# Patient Record
Sex: MALE | Race: WHITE
[De-identification: names, ages, dates, MRNs, and addresses within clinical notes are randomized per-mention and may not be internally consistent; named-entity substitution may affect disease eponyms.]

---

## 2017-04-02 ENCOUNTER — HOSPITAL ENCOUNTER (EMERGENCY)
Dept: HOSPITAL 4 - SED | Age: 72
Discharge: HOME | End: 2017-04-02
Payer: COMMERCIAL

## 2017-04-02 VITALS
DIASTOLIC BLOOD PRESSURE: 59 MMHG | HEART RATE: 62 BPM | TEMPERATURE: 98.2 F | OXYGEN SATURATION: 99 % | RESPIRATION RATE: 16 BRPM | SYSTOLIC BLOOD PRESSURE: 118 MMHG

## 2017-04-02 VITALS
SYSTOLIC BLOOD PRESSURE: 115 MMHG | TEMPERATURE: 98.2 F | RESPIRATION RATE: 16 BRPM | OXYGEN SATURATION: 99 % | DIASTOLIC BLOOD PRESSURE: 56 MMHG | HEART RATE: 51 BPM

## 2017-04-02 VITALS — WEIGHT: 240 LBS | BODY MASS INDEX: 35.55 KG/M2 | HEIGHT: 69 IN

## 2017-04-02 DIAGNOSIS — I10: ICD-10-CM
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
## 2017-04-02 DIAGNOSIS — S82.52XA: Primary | ICD-10-CM
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
## 2017-04-02 DIAGNOSIS — I48.91: ICD-10-CM
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
## 2017-04-02 DIAGNOSIS — J44.9: ICD-10-CM

## 2017-04-02 DIAGNOSIS — Z88.8: ICD-10-CM

## 2017-04-02 NOTE — NUR
Pt presents to ED c/o LLE pain and brusing to foot .Pt currently taking 
coumadin. Pt denies syncope , head or neck trauma.Pt declined medication for 
pain upon assessment. Pt reports taking Norco prior to ED visit.

## 2017-04-02 NOTE — NUR
Patient given written and verbal discharge instructions and verbalizes 
understanding.  ER MD discussed with patient the results and treatment 
provided. Given copies of tests performed in ER. Patient in stable condition. 
ID arm band removed. 

 Patient educated on pain management and to follow up with PMD. Pain Scale 2.

Opportunity for questions provided and answered.

## 2020-12-09 ENCOUNTER — HOSPITAL ENCOUNTER (INPATIENT)
Dept: HOSPITAL 4 - SED | Age: 75
LOS: 4 days | DRG: 871 | End: 2020-12-13
Attending: INTERNAL MEDICINE | Admitting: INTERNAL MEDICINE
Payer: COMMERCIAL

## 2020-12-09 VITALS — SYSTOLIC BLOOD PRESSURE: 142 MMHG

## 2020-12-09 VITALS — BODY MASS INDEX: 25.48 KG/M2 | HEIGHT: 69 IN | WEIGHT: 172 LBS

## 2020-12-09 VITALS — SYSTOLIC BLOOD PRESSURE: 120 MMHG

## 2020-12-09 VITALS — SYSTOLIC BLOOD PRESSURE: 134 MMHG

## 2020-12-09 DIAGNOSIS — Z66: ICD-10-CM

## 2020-12-09 DIAGNOSIS — Z88.8: ICD-10-CM

## 2020-12-09 DIAGNOSIS — J44.1: ICD-10-CM

## 2020-12-09 DIAGNOSIS — Z79.891: ICD-10-CM

## 2020-12-09 DIAGNOSIS — J96.00: ICD-10-CM

## 2020-12-09 DIAGNOSIS — Z20.828: ICD-10-CM

## 2020-12-09 DIAGNOSIS — N17.9: ICD-10-CM

## 2020-12-09 DIAGNOSIS — A41.9: Primary | ICD-10-CM

## 2020-12-09 DIAGNOSIS — I25.5: ICD-10-CM

## 2020-12-09 DIAGNOSIS — I46.9: ICD-10-CM

## 2020-12-09 DIAGNOSIS — Z79.01: ICD-10-CM

## 2020-12-09 DIAGNOSIS — Z79.899: ICD-10-CM

## 2020-12-09 DIAGNOSIS — I25.10: ICD-10-CM

## 2020-12-09 DIAGNOSIS — F17.210: ICD-10-CM

## 2020-12-09 DIAGNOSIS — Z51.5: ICD-10-CM

## 2020-12-09 DIAGNOSIS — Z79.82: ICD-10-CM

## 2020-12-09 DIAGNOSIS — J18.9: ICD-10-CM

## 2020-12-09 DIAGNOSIS — I48.20: ICD-10-CM

## 2020-12-09 DIAGNOSIS — E11.51: ICD-10-CM

## 2020-12-09 DIAGNOSIS — I50.43: ICD-10-CM

## 2020-12-09 DIAGNOSIS — I11.0: ICD-10-CM

## 2020-12-09 LAB
ALBUMIN SERPL BCP-MCNC: 2.7 G/DL (ref 3.4–4.8)
ALT SERPL W P-5'-P-CCNC: 17 U/L (ref 12–78)
ANION GAP SERPL CALCULATED.3IONS-SCNC: 8 MMOL/L (ref 5–15)
AST SERPL W P-5'-P-CCNC: 41 U/L (ref 10–37)
BASOPHILS # BLD AUTO: 0.1 K/UL (ref 0–0.2)
BASOPHILS NFR BLD AUTO: 1.2 % (ref 0–2)
BILIRUB SERPL-MCNC: 0.9 MG/DL (ref 0–1)
BUN SERPL-MCNC: 23 MG/DL (ref 8–21)
CALCIUM SERPL-MCNC: 9.2 MG/DL (ref 8.4–11)
CHLORIDE SERPL-SCNC: 104 MMOL/L (ref 98–107)
CREAT SERPL-MCNC: 1.38 MG/DL (ref 0.55–1.3)
EOSINOPHIL # BLD AUTO: 0.1 K/UL (ref 0–0.4)
EOSINOPHIL NFR BLD AUTO: 1 % (ref 0–4)
ERYTHROCYTE [DISTWIDTH] IN BLOOD BY AUTOMATED COUNT: 18.8 % (ref 9–15)
GFR SERPL CREATININE-BSD FRML MDRD: (no result) ML/MIN (ref 90–?)
GLUCOSE SERPL-MCNC: 207 MG/DL (ref 70–99)
HCT VFR BLD AUTO: 40.6 % (ref 36–54)
HGB BLD-MCNC: 13.2 G/DL (ref 14–18)
INR PPP: 1.2 (ref 0.8–1.2)
LYMPHOCYTES # BLD AUTO: 1.3 K/UL (ref 1–5.5)
LYMPHOCYTES NFR BLD AUTO: 15.3 % (ref 20.5–51.5)
MCH RBC QN AUTO: 28 PG (ref 27–31)
MCHC RBC AUTO-ENTMCNC: 33 % (ref 32–36)
MCV RBC AUTO: 85 FL (ref 79–98)
MONOCYTES # BLD MANUAL: 1 K/UL (ref 0–1)
MONOCYTES # BLD MANUAL: 11.5 % (ref 1.7–9.3)
NEUTROPHILS # BLD AUTO: 6.2 K/UL (ref 1.8–7.7)
NEUTROPHILS NFR BLD AUTO: 71 % (ref 40–70)
PLATELET # BLD AUTO: 279 K/UL (ref 130–430)
POTASSIUM SERPL-SCNC: 4.4 MMOL/L (ref 3.5–5.1)
PROTHROMBIN TIME: 12.6 SECS (ref 9.5–12.5)
RBC # BLD AUTO: 4.81 MIL/UL (ref 4.2–6.2)
SODIUM SERPLBLD-SCNC: 137 MMOL/L (ref 136–145)
WBC # BLD AUTO: 8.7 K/UL (ref 4.8–10.8)

## 2020-12-09 PROCEDURE — G0378 HOSPITAL OBSERVATION PER HR: HCPCS

## 2020-12-09 PROCEDURE — P9041 ALBUMIN (HUMAN),5%, 50ML: HCPCS

## 2020-12-09 PROCEDURE — U0003 INFECTIOUS AGENT DETECTION BY NUCLEIC ACID (DNA OR RNA); SEVERE ACUTE RESPIRATORY SYNDROME CORONAVIRUS 2 (SARS-COV-2) (CORONAVIRUS DISEASE [COVID-19]), AMPLIFIED PROBE TECHNIQUE, MAKING USE OF HIGH THROUGHPUT TECHNOLOGIES AS DESCRIBED BY CMS-2020-01-R: HCPCS

## 2020-12-09 RX ADMIN — APIXABAN SCH MG: 2.5 TABLET, FILM COATED ORAL at 21:21

## 2020-12-09 RX ADMIN — SACUBITRIL AND VALSARTAN SCH TABLET: 24; 26 TABLET, FILM COATED ORAL at 21:21

## 2020-12-09 RX ADMIN — NATEGLINIDE SCH MG: 120 TABLET ORAL at 21:18

## 2020-12-09 RX ADMIN — CEFTRIAXONE SODIUM SCH MLS/HR: 1 INJECTION, SOLUTION INTRAVENOUS at 21:14

## 2020-12-09 RX ADMIN — FUROSEMIDE SCH MG: 10 INJECTION, SOLUTION INTRAMUSCULAR; INTRAVENOUS at 18:29

## 2020-12-09 RX ADMIN — TEMAZEPAM SCH MG: 15 CAPSULE ORAL at 21:16

## 2020-12-09 RX ADMIN — Medication SCH MG: at 21:19

## 2020-12-10 VITALS — SYSTOLIC BLOOD PRESSURE: 96 MMHG

## 2020-12-10 VITALS — SYSTOLIC BLOOD PRESSURE: 124 MMHG

## 2020-12-10 VITALS — SYSTOLIC BLOOD PRESSURE: 104 MMHG

## 2020-12-10 VITALS — SYSTOLIC BLOOD PRESSURE: 131 MMHG

## 2020-12-10 VITALS — SYSTOLIC BLOOD PRESSURE: 112 MMHG

## 2020-12-10 VITALS — SYSTOLIC BLOOD PRESSURE: 94 MMHG

## 2020-12-10 VITALS — SYSTOLIC BLOOD PRESSURE: 110 MMHG

## 2020-12-10 VITALS — SYSTOLIC BLOOD PRESSURE: 106 MMHG

## 2020-12-10 VITALS — SYSTOLIC BLOOD PRESSURE: 95 MMHG

## 2020-12-10 VITALS — SYSTOLIC BLOOD PRESSURE: 120 MMHG

## 2020-12-10 LAB
ALBUMIN SERPL BCP-MCNC: 2.6 G/DL (ref 3.4–4.8)
ALT SERPL W P-5'-P-CCNC: 80 U/L (ref 12–78)
ANION GAP SERPL CALCULATED.3IONS-SCNC: 10 MMOL/L (ref 5–15)
AST SERPL W P-5'-P-CCNC: 170 U/L (ref 10–37)
BASOPHILS # BLD AUTO: 0.1 K/UL (ref 0–0.2)
BASOPHILS NFR BLD AUTO: 0.9 % (ref 0–2)
BILIRUB SERPL-MCNC: 1.3 MG/DL (ref 0–1)
BUN SERPL-MCNC: 25 MG/DL (ref 8–21)
CALCIUM SERPL-MCNC: 8.9 MG/DL (ref 8.4–11)
CHLORIDE SERPL-SCNC: 104 MMOL/L (ref 98–107)
CREAT SERPL-MCNC: 1.48 MG/DL (ref 0.55–1.3)
EOSINOPHIL # BLD AUTO: 0.1 K/UL (ref 0–0.4)
EOSINOPHIL NFR BLD AUTO: 1 % (ref 0–4)
ERYTHROCYTE [DISTWIDTH] IN BLOOD BY AUTOMATED COUNT: 18.8 % (ref 9–15)
GFR SERPL CREATININE-BSD FRML MDRD: (no result) ML/MIN (ref 90–?)
GLUCOSE SERPL-MCNC: 147 MG/DL (ref 70–99)
HCT VFR BLD AUTO: 41.4 % (ref 36–54)
HGB BLD-MCNC: 13.4 G/DL (ref 14–18)
LYMPHOCYTES # BLD AUTO: 1.4 K/UL (ref 1–5.5)
LYMPHOCYTES NFR BLD AUTO: 16 % (ref 20.5–51.5)
MCH RBC QN AUTO: 28 PG (ref 27–31)
MCHC RBC AUTO-ENTMCNC: 32 % (ref 32–36)
MCV RBC AUTO: 85 FL (ref 79–98)
MONOCYTES # BLD MANUAL: 0.9 K/UL (ref 0–1)
MONOCYTES # BLD MANUAL: 10.5 % (ref 1.7–9.3)
NEUTROPHILS # BLD AUTO: 6.2 K/UL (ref 1.8–7.7)
NEUTROPHILS NFR BLD AUTO: 71.6 % (ref 40–70)
PLATELET # BLD AUTO: 253 K/UL (ref 130–430)
POTASSIUM SERPL-SCNC: 4.2 MMOL/L (ref 3.5–5.1)
RBC # BLD AUTO: 4.87 MIL/UL (ref 4.2–6.2)
SODIUM SERPLBLD-SCNC: 137 MMOL/L (ref 136–145)
TSH SERPL DL<=0.05 MIU/L-ACNC: 2.11 UIU/ML (ref 0.36–3.74)
WBC # BLD AUTO: 8.7 K/UL (ref 4.8–10.8)

## 2020-12-10 PROCEDURE — B54MZZA ULTRASONOGRAPHY OF RIGHT UPPER EXTREMITY VEINS, GUIDANCE: ICD-10-PCS

## 2020-12-10 PROCEDURE — 0BH17EZ INSERTION OF ENDOTRACHEAL AIRWAY INTO TRACHEA, VIA NATURAL OR ARTIFICIAL OPENING: ICD-10-PCS

## 2020-12-10 PROCEDURE — 05HY33Z INSERTION OF INFUSION DEVICE INTO UPPER VEIN, PERCUTANEOUS APPROACH: ICD-10-PCS

## 2020-12-10 PROCEDURE — 5A1945Z RESPIRATORY VENTILATION, 24-96 CONSECUTIVE HOURS: ICD-10-PCS

## 2020-12-10 RX ADMIN — Medication SCH MG: at 21:00

## 2020-12-10 RX ADMIN — Medication SCH MG: at 09:40

## 2020-12-10 RX ADMIN — SODIUM CHLORIDE SCH MLS/HR: 0.9 INJECTION, SOLUTION INTRAVENOUS at 12:10

## 2020-12-10 RX ADMIN — FUROSEMIDE SCH MG: 10 INJECTION, SOLUTION INTRAMUSCULAR; INTRAVENOUS at 06:23

## 2020-12-10 RX ADMIN — SACUBITRIL AND VALSARTAN SCH TABLET: 24; 26 TABLET, FILM COATED ORAL at 09:41

## 2020-12-10 RX ADMIN — FUROSEMIDE SCH MG: 10 INJECTION, SOLUTION INTRAMUSCULAR; INTRAVENOUS at 17:11

## 2020-12-10 RX ADMIN — NATEGLINIDE SCH MG: 120 TABLET ORAL at 15:00

## 2020-12-10 RX ADMIN — APIXABAN SCH MG: 2.5 TABLET, FILM COATED ORAL at 21:00

## 2020-12-10 RX ADMIN — SERTRALINE HYDROCHLORIDE SCH MG: 50 TABLET, FILM COATED ORAL at 09:39

## 2020-12-10 RX ADMIN — APIXABAN SCH MG: 2.5 TABLET, FILM COATED ORAL at 09:42

## 2020-12-10 RX ADMIN — CEFTRIAXONE SODIUM SCH MLS/HR: 1 INJECTION, SOLUTION INTRAVENOUS at 20:00

## 2020-12-10 RX ADMIN — NATEGLINIDE SCH MG: 120 TABLET ORAL at 09:49

## 2020-12-10 RX ADMIN — TEMAZEPAM SCH MG: 15 CAPSULE ORAL at 21:00

## 2020-12-10 RX ADMIN — SACUBITRIL AND VALSARTAN SCH TABLET: 24; 26 TABLET, FILM COATED ORAL at 21:00

## 2020-12-10 RX ADMIN — Medication SCH MG: at 09:50

## 2020-12-10 RX ADMIN — ATORVASTATIN CALCIUM SCH MG: 20 TABLET, FILM COATED ORAL at 09:39

## 2020-12-10 RX ADMIN — NATEGLINIDE SCH MG: 120 TABLET ORAL at 21:00

## 2020-12-11 VITALS — SYSTOLIC BLOOD PRESSURE: 106 MMHG

## 2020-12-11 VITALS — SYSTOLIC BLOOD PRESSURE: 104 MMHG

## 2020-12-11 VITALS — SYSTOLIC BLOOD PRESSURE: 115 MMHG

## 2020-12-11 VITALS — SYSTOLIC BLOOD PRESSURE: 101 MMHG

## 2020-12-11 VITALS — SYSTOLIC BLOOD PRESSURE: 80 MMHG

## 2020-12-11 VITALS — SYSTOLIC BLOOD PRESSURE: 122 MMHG

## 2020-12-11 VITALS — SYSTOLIC BLOOD PRESSURE: 109 MMHG

## 2020-12-11 VITALS — SYSTOLIC BLOOD PRESSURE: 105 MMHG

## 2020-12-11 VITALS — SYSTOLIC BLOOD PRESSURE: 135 MMHG

## 2020-12-11 VITALS — SYSTOLIC BLOOD PRESSURE: 125 MMHG

## 2020-12-11 VITALS — SYSTOLIC BLOOD PRESSURE: 92 MMHG

## 2020-12-11 VITALS — SYSTOLIC BLOOD PRESSURE: 108 MMHG

## 2020-12-11 VITALS — SYSTOLIC BLOOD PRESSURE: 119 MMHG

## 2020-12-11 VITALS — SYSTOLIC BLOOD PRESSURE: 123 MMHG

## 2020-12-11 VITALS — SYSTOLIC BLOOD PRESSURE: 128 MMHG

## 2020-12-11 VITALS — SYSTOLIC BLOOD PRESSURE: 97 MMHG

## 2020-12-11 VITALS — SYSTOLIC BLOOD PRESSURE: 62 MMHG

## 2020-12-11 VITALS — SYSTOLIC BLOOD PRESSURE: 141 MMHG

## 2020-12-11 VITALS — SYSTOLIC BLOOD PRESSURE: 134 MMHG

## 2020-12-11 VITALS — SYSTOLIC BLOOD PRESSURE: 155 MMHG

## 2020-12-11 VITALS — SYSTOLIC BLOOD PRESSURE: 130 MMHG

## 2020-12-11 VITALS — SYSTOLIC BLOOD PRESSURE: 64 MMHG

## 2020-12-11 VITALS — SYSTOLIC BLOOD PRESSURE: 110 MMHG

## 2020-12-11 VITALS — SYSTOLIC BLOOD PRESSURE: 129 MMHG

## 2020-12-11 VITALS — SYSTOLIC BLOOD PRESSURE: 102 MMHG

## 2020-12-11 LAB
ALBUMIN SERPL BCP-MCNC: 2 G/DL (ref 3.4–4.8)
ALT SERPL W P-5'-P-CCNC: 248 U/L (ref 12–78)
ANION GAP SERPL CALCULATED.3IONS-SCNC: 15 MMOL/L (ref 5–15)
AST SERPL W P-5'-P-CCNC: 732 U/L (ref 10–37)
BASOPHILS # BLD AUTO: 0.1 K/UL (ref 0–0.2)
BASOPHILS NFR BLD AUTO: 0.4 % (ref 0–2)
BILIRUB SERPL-MCNC: 0.9 MG/DL (ref 0–1)
BUN SERPL-MCNC: 43 MG/DL (ref 8–21)
CALCIUM SERPL-MCNC: 8.5 MG/DL (ref 8.4–11)
CHLORIDE SERPL-SCNC: 103 MMOL/L (ref 98–107)
CREAT SERPL-MCNC: 2.44 MG/DL (ref 0.55–1.3)
EOSINOPHIL # BLD AUTO: 0 K/UL (ref 0–0.4)
EOSINOPHIL NFR BLD AUTO: 0 % (ref 0–4)
ERYTHROCYTE [DISTWIDTH] IN BLOOD BY AUTOMATED COUNT: 18.5 % (ref 9–15)
GFR SERPL CREATININE-BSD FRML MDRD: (no result) ML/MIN (ref 90–?)
GLUCOSE SERPL-MCNC: 244 MG/DL (ref 70–99)
HCT VFR BLD AUTO: 39.1 % (ref 36–54)
HGB BLD-MCNC: 12.5 G/DL (ref 14–18)
LYMPHOCYTES # BLD AUTO: 0.6 K/UL (ref 1–5.5)
LYMPHOCYTES NFR BLD AUTO: 4.5 % (ref 20.5–51.5)
MCH RBC QN AUTO: 27 PG (ref 27–31)
MCHC RBC AUTO-ENTMCNC: 32 % (ref 32–36)
MCV RBC AUTO: 85 FL (ref 79–98)
MONOCYTES # BLD MANUAL: 0.8 K/UL (ref 0–1)
MONOCYTES # BLD MANUAL: 5.4 % (ref 1.7–9.3)
NEUTROPHILS # BLD AUTO: 12.8 K/UL (ref 1.8–7.7)
NEUTROPHILS NFR BLD AUTO: 89.7 % (ref 40–70)
PLATELET # BLD AUTO: 228 K/UL (ref 130–430)
POTASSIUM SERPL-SCNC: 4.4 MMOL/L (ref 3.5–5.1)
RBC # BLD AUTO: 4.59 MIL/UL (ref 4.2–6.2)
SODIUM SERPLBLD-SCNC: 136 MMOL/L (ref 136–145)
WBC # BLD AUTO: 14.2 K/UL (ref 4.8–10.8)

## 2020-12-11 RX ADMIN — SODIUM CHLORIDE SCH MLS/HR: 0.9 INJECTION, SOLUTION INTRAVENOUS at 11:51

## 2020-12-11 RX ADMIN — CEFTRIAXONE SODIUM SCH MLS/HR: 1 INJECTION, SOLUTION INTRAVENOUS at 20:40

## 2020-12-11 RX ADMIN — NATEGLINIDE SCH MG: 120 TABLET ORAL at 08:58

## 2020-12-11 RX ADMIN — ATORVASTATIN CALCIUM SCH MG: 20 TABLET, FILM COATED ORAL at 08:54

## 2020-12-11 RX ADMIN — SERTRALINE HYDROCHLORIDE SCH MG: 50 TABLET, FILM COATED ORAL at 08:58

## 2020-12-11 RX ADMIN — Medication SCH MG: at 08:57

## 2020-12-11 RX ADMIN — PROPOFOL PRN MLS/HR: 10 INJECTION, EMULSION INTRAVENOUS at 14:26

## 2020-12-11 RX ADMIN — APIXABAN SCH MG: 2.5 TABLET, FILM COATED ORAL at 08:54

## 2020-12-11 RX ADMIN — SACUBITRIL AND VALSARTAN SCH TABLET: 24; 26 TABLET, FILM COATED ORAL at 20:41

## 2020-12-11 RX ADMIN — TEMAZEPAM SCH MG: 15 CAPSULE ORAL at 20:40

## 2020-12-11 RX ADMIN — SACUBITRIL AND VALSARTAN SCH TABLET: 24; 26 TABLET, FILM COATED ORAL at 08:57

## 2020-12-11 RX ADMIN — PROPOFOL PRN MLS/HR: 10 INJECTION, EMULSION INTRAVENOUS at 00:00

## 2020-12-11 RX ADMIN — Medication SCH MG: at 08:56

## 2020-12-11 RX ADMIN — APIXABAN SCH MG: 2.5 TABLET, FILM COATED ORAL at 20:40

## 2020-12-11 RX ADMIN — FUROSEMIDE SCH MG: 10 INJECTION, SOLUTION INTRAMUSCULAR; INTRAVENOUS at 06:00

## 2020-12-12 VITALS — SYSTOLIC BLOOD PRESSURE: 100 MMHG

## 2020-12-12 VITALS — SYSTOLIC BLOOD PRESSURE: 107 MMHG

## 2020-12-12 VITALS — SYSTOLIC BLOOD PRESSURE: 147 MMHG

## 2020-12-12 VITALS — SYSTOLIC BLOOD PRESSURE: 122 MMHG

## 2020-12-12 VITALS — SYSTOLIC BLOOD PRESSURE: 103 MMHG

## 2020-12-12 VITALS — SYSTOLIC BLOOD PRESSURE: 137 MMHG

## 2020-12-12 VITALS — SYSTOLIC BLOOD PRESSURE: 150 MMHG

## 2020-12-12 VITALS — SYSTOLIC BLOOD PRESSURE: 113 MMHG

## 2020-12-12 VITALS — SYSTOLIC BLOOD PRESSURE: 85 MMHG

## 2020-12-12 VITALS — SYSTOLIC BLOOD PRESSURE: 152 MMHG

## 2020-12-12 VITALS — SYSTOLIC BLOOD PRESSURE: 125 MMHG

## 2020-12-12 VITALS — SYSTOLIC BLOOD PRESSURE: 139 MMHG

## 2020-12-12 VITALS — SYSTOLIC BLOOD PRESSURE: 112 MMHG

## 2020-12-12 VITALS — SYSTOLIC BLOOD PRESSURE: 135 MMHG

## 2020-12-12 VITALS — SYSTOLIC BLOOD PRESSURE: 98 MMHG

## 2020-12-12 VITALS — SYSTOLIC BLOOD PRESSURE: 148 MMHG

## 2020-12-12 VITALS — SYSTOLIC BLOOD PRESSURE: 172 MMHG

## 2020-12-12 VITALS — SYSTOLIC BLOOD PRESSURE: 120 MMHG

## 2020-12-12 VITALS — SYSTOLIC BLOOD PRESSURE: 110 MMHG

## 2020-12-12 VITALS — SYSTOLIC BLOOD PRESSURE: 115 MMHG

## 2020-12-12 VITALS — SYSTOLIC BLOOD PRESSURE: 114 MMHG

## 2020-12-12 VITALS — SYSTOLIC BLOOD PRESSURE: 130 MMHG

## 2020-12-12 VITALS — SYSTOLIC BLOOD PRESSURE: 99 MMHG

## 2020-12-12 VITALS — SYSTOLIC BLOOD PRESSURE: 169 MMHG

## 2020-12-12 LAB
ALBUMIN SERPL BCP-MCNC: 1.8 G/DL (ref 3.4–4.8)
ALT SERPL W P-5'-P-CCNC: 216 U/L (ref 12–78)
ANION GAP SERPL CALCULATED.3IONS-SCNC: 12 MMOL/L (ref 5–15)
AST SERPL W P-5'-P-CCNC: 241 U/L (ref 10–37)
BASOPHILS # BLD AUTO: 0 K/UL (ref 0–0.2)
BASOPHILS NFR BLD AUTO: 0.2 % (ref 0–2)
BILIRUB SERPL-MCNC: 0.7 MG/DL (ref 0–1)
BUN SERPL-MCNC: 59 MG/DL (ref 8–21)
CALCIUM SERPL-MCNC: 8.2 MG/DL (ref 8.4–11)
CHLORIDE SERPL-SCNC: 104 MMOL/L (ref 98–107)
CREAT SERPL-MCNC: 3.13 MG/DL (ref 0.55–1.3)
EOSINOPHIL # BLD AUTO: 0 K/UL (ref 0–0.4)
EOSINOPHIL NFR BLD AUTO: 0.2 % (ref 0–4)
ERYTHROCYTE [DISTWIDTH] IN BLOOD BY AUTOMATED COUNT: 18.7 % (ref 9–15)
GFR SERPL CREATININE-BSD FRML MDRD: (no result) ML/MIN (ref 90–?)
GLUCOSE SERPL-MCNC: 124 MG/DL (ref 70–99)
HCT VFR BLD AUTO: 38.9 % (ref 36–54)
HGB BLD-MCNC: 12.6 G/DL (ref 14–18)
LYMPHOCYTES # BLD AUTO: 0.8 K/UL (ref 1–5.5)
LYMPHOCYTES NFR BLD AUTO: 6.8 % (ref 20.5–51.5)
MCH RBC QN AUTO: 27 PG (ref 27–31)
MCHC RBC AUTO-ENTMCNC: 32 % (ref 32–36)
MCV RBC AUTO: 84 FL (ref 79–98)
MONOCYTES # BLD MANUAL: 0.9 K/UL (ref 0–1)
MONOCYTES # BLD MANUAL: 7.2 % (ref 1.7–9.3)
NEUTROPHILS # BLD AUTO: 10.3 K/UL (ref 1.8–7.7)
NEUTROPHILS NFR BLD AUTO: 85.6 % (ref 40–70)
PLATELET # BLD AUTO: 223 K/UL (ref 130–430)
POTASSIUM SERPL-SCNC: 3.6 MMOL/L (ref 3.5–5.1)
RBC # BLD AUTO: 4.63 MIL/UL (ref 4.2–6.2)
SODIUM SERPLBLD-SCNC: 138 MMOL/L (ref 136–145)
WBC # BLD AUTO: 12.1 K/UL (ref 4.8–10.8)

## 2020-12-12 RX ADMIN — CEFTRIAXONE SODIUM SCH MLS/HR: 1 INJECTION, SOLUTION INTRAVENOUS at 20:00

## 2020-12-12 RX ADMIN — APIXABAN SCH MG: 2.5 TABLET, FILM COATED ORAL at 20:36

## 2020-12-12 RX ADMIN — SACUBITRIL AND VALSARTAN SCH TABLET: 24; 26 TABLET, FILM COATED ORAL at 20:36

## 2020-12-12 RX ADMIN — TEMAZEPAM SCH MG: 15 CAPSULE ORAL at 20:36

## 2020-12-12 RX ADMIN — SERTRALINE HYDROCHLORIDE SCH MG: 50 TABLET, FILM COATED ORAL at 09:24

## 2020-12-12 RX ADMIN — ATORVASTATIN CALCIUM SCH MG: 20 TABLET, FILM COATED ORAL at 09:25

## 2020-12-12 RX ADMIN — SACUBITRIL AND VALSARTAN SCH TABLET: 24; 26 TABLET, FILM COATED ORAL at 09:27

## 2020-12-12 RX ADMIN — PROPOFOL PRN MLS/HR: 10 INJECTION, EMULSION INTRAVENOUS at 10:06

## 2020-12-12 RX ADMIN — SODIUM CHLORIDE SCH MLS/HR: 0.9 INJECTION, SOLUTION INTRAVENOUS at 10:03

## 2020-12-12 RX ADMIN — Medication SCH MG: at 09:27

## 2020-12-12 RX ADMIN — APIXABAN SCH MG: 2.5 TABLET, FILM COATED ORAL at 09:25

## 2020-12-13 VITALS — SYSTOLIC BLOOD PRESSURE: 120 MMHG

## 2020-12-13 PROCEDURE — 5A12012 PERFORMANCE OF CARDIAC OUTPUT, SINGLE, MANUAL: ICD-10-PCS
